# Patient Record
Sex: FEMALE | Race: BLACK OR AFRICAN AMERICAN | NOT HISPANIC OR LATINO | URBAN - METROPOLITAN AREA
[De-identification: names, ages, dates, MRNs, and addresses within clinical notes are randomized per-mention and may not be internally consistent; named-entity substitution may affect disease eponyms.]

---

## 2019-12-05 ENCOUNTER — APPOINTMENT (OUTPATIENT)
Dept: URBAN - METROPOLITAN AREA CLINIC 237 | Age: 44
Setting detail: DERMATOLOGY
End: 2019-12-05

## 2019-12-05 DIAGNOSIS — L20.89 OTHER ATOPIC DERMATITIS: ICD-10-CM

## 2019-12-05 PROCEDURE — OTHER TREATMENT REGIMEN: OTHER

## 2019-12-05 PROCEDURE — OTHER MIPS QUALITY: OTHER

## 2019-12-05 PROCEDURE — OTHER COUNSELING: OTHER

## 2019-12-05 PROCEDURE — OTHER KOH PREP: OTHER

## 2019-12-05 PROCEDURE — OTHER PATIENT SPECIFIC COUNSELING: OTHER

## 2019-12-05 PROCEDURE — OTHER PRESCRIPTION: OTHER

## 2019-12-05 PROCEDURE — OTHER DIAGNOSIS COMMENT: OTHER

## 2019-12-05 PROCEDURE — OTHER MEDICATION COUNSELING: OTHER

## 2019-12-05 PROCEDURE — 87220 TISSUE EXAM FOR FUNGI: CPT

## 2019-12-05 PROCEDURE — 99203 OFFICE O/P NEW LOW 30 MIN: CPT

## 2019-12-05 RX ORDER — CLOBETASOL PROPIONATE 0.46 MG/ML
SM AMT SOLUTION TOPICAL
Qty: 1 | Refills: 0 | Status: ERX | COMMUNITY
Start: 2019-12-05

## 2019-12-05 RX ORDER — KETOCONAZOLE 20 MG/ML
SM AMT SHAMPOO TOPICAL BID
Qty: 1 | Refills: 0 | Status: ERX | COMMUNITY
Start: 2019-12-05

## 2019-12-05 RX ORDER — TRIAMCINOLONE ACETONIDE 1 MG/G
SM AMT OINTMENT TOPICAL BID PRN
Qty: 1 | Refills: 1 | Status: ERX | COMMUNITY
Start: 2019-12-05

## 2019-12-05 RX ORDER — FLUOCINONIDE 0.5 MG/G
SM AMT OINTMENT TOPICAL BID
Qty: 1 | Refills: 0 | Status: ERX | COMMUNITY
Start: 2019-12-05

## 2019-12-05 ASSESSMENT — LOCATION DETAILED DESCRIPTION DERM
LOCATION DETAILED: INFERIOR THORACIC SPINE
LOCATION DETAILED: RIGHT DISTAL LATERAL POSTERIOR THIGH
LOCATION DETAILED: LEFT CENTRAL FRONTAL SCALP
LOCATION DETAILED: LEFT ANTERIOR PROXIMAL UPPER ARM
LOCATION DETAILED: RIGHT ANTERIOR PROXIMAL UPPER ARM
LOCATION DETAILED: LEFT INFERIOR UPPER BACK
LOCATION DETAILED: MIDDLE STERNUM
LOCATION DETAILED: LEFT PROXIMAL POSTERIOR THIGH

## 2019-12-05 ASSESSMENT — LOCATION SIMPLE DESCRIPTION DERM
LOCATION SIMPLE: UPPER BACK
LOCATION SIMPLE: LEFT SCALP
LOCATION SIMPLE: LEFT UPPER ARM
LOCATION SIMPLE: LEFT POSTERIOR THIGH
LOCATION SIMPLE: RIGHT UPPER ARM
LOCATION SIMPLE: CHEST
LOCATION SIMPLE: LEFT UPPER BACK
LOCATION SIMPLE: RIGHT POSTERIOR THIGH

## 2019-12-05 ASSESSMENT — LOCATION ZONE DERM
LOCATION ZONE: ARM
LOCATION ZONE: TRUNK
LOCATION ZONE: SCALP
LOCATION ZONE: LEG

## 2019-12-05 NOTE — PROCEDURE: PATIENT SPECIFIC COUNSELING
Detail Level: Zone
Suggested switching from Dial soap to Dove bar soap. Only use soap in sweaty skin folds areas. Stressed showering in lukewarm water and moisturizing post-shower. Recommends getting a humidifier in home to help retain moisture to skin. \\n\\nSuggested trying TMC to body rash, but pt states this hasn’t worked for her in the past when she had a bad flare. Will prescribe Lidex BABATUNDE for bad flares for up to two weeks only and TMC BABATUNDE when more controlled. Do not apply lidex to face or axillae. \\n\\nFor scalp, will prescribe Clob Sln and Keto shampoo. Increase hair washing to twice weekly if possible. \\n\\nDistribution and morphology slightly atypical for atopic dermatitis. Per patient, she has had three biopsies demonstrating eczema (other derm wanted to r/o psoriasis). If no resolution with above plan, need to consider other etiologies including psoriasis or less likely LP. No nail pitting. + joint pain per patient due to osteoarthritis. Will monitor closely.

## 2019-12-05 NOTE — PROCEDURE: MEDICATION COUNSELING
Xeltamekaz Pregnancy And Lactation Text: This medication is Pregnancy Category D and is not considered safe during pregnancy.  The risk during breast feeding is also uncertain.

## 2019-12-05 NOTE — PROCEDURE: TREATMENT REGIMEN
Initiate Treatment: triamcinolone acetonide 0.1 % topical ointment BID prn\\nDays Supply: 30\\nSig: Small amount twice daily to affected rash qd-bid PRN.  Rub in well.\\n\\nfluocinonide 0.05 % topical ointment Bid\\nDays Supply: 30\\nSig: Apply to itchy body rash areas bid X 2 weeks. Rub in well.\\n\\nketoconazole 2 % shampoo Bid\\nDays Supply: 30\\nSig: Shampoo scalp 2x/PRN. Let shampoo sit on scalp for 5 minutes before rinsing.\\n\\nclobetasol 0.05 % scalp solution Qd-bid PRN\\nSig: Apply 1-2 gtts to scalp rash qd PRN for itch.
Detail Level: Zone

## 2020-02-04 ENCOUNTER — APPOINTMENT (OUTPATIENT)
Dept: URBAN - METROPOLITAN AREA CLINIC 237 | Age: 45
Setting detail: DERMATOLOGY
End: 2020-02-04

## 2020-02-04 DIAGNOSIS — L20.89 OTHER ATOPIC DERMATITIS: ICD-10-CM

## 2020-02-04 PROBLEM — L30.9 DERMATITIS, UNSPECIFIED: Status: ACTIVE | Noted: 2020-02-04

## 2020-02-04 PROCEDURE — OTHER TREATMENT REGIMEN: OTHER

## 2020-02-04 PROCEDURE — OTHER PATIENT SPECIFIC COUNSELING: OTHER

## 2020-02-04 PROCEDURE — OTHER BIOPSY BY PUNCH METHOD: OTHER

## 2020-02-04 PROCEDURE — OTHER SEPARATE AND IDENTIFIABLE DOCUMENTATION: OTHER

## 2020-02-04 PROCEDURE — OTHER MEDICATION COUNSELING: OTHER

## 2020-02-04 PROCEDURE — 11104 PUNCH BX SKIN SINGLE LESION: CPT

## 2020-02-04 PROCEDURE — 99213 OFFICE O/P EST LOW 20 MIN: CPT | Mod: 25

## 2020-02-04 PROCEDURE — OTHER MIPS QUALITY: OTHER

## 2020-02-04 PROCEDURE — OTHER PHOTO-DOCUMENTATION: OTHER

## 2020-02-04 PROCEDURE — OTHER PRESCRIPTION: OTHER

## 2020-02-04 RX ORDER — CLOBETASOL PROPIONATE 0.5 MG/G
SM AMT OINTMENT TOPICAL BID
Qty: 1 | Refills: 0 | Status: ERX | COMMUNITY
Start: 2020-02-04

## 2020-02-04 ASSESSMENT — LOCATION SIMPLE DESCRIPTION DERM: LOCATION SIMPLE: RIGHT LOWER BACK

## 2020-02-04 ASSESSMENT — LOCATION ZONE DERM: LOCATION ZONE: TRUNK

## 2020-02-04 ASSESSMENT — LOCATION DETAILED DESCRIPTION DERM: LOCATION DETAILED: RIGHT SUPERIOR LATERAL MIDBACK

## 2020-02-04 NOTE — PROCEDURE: PATIENT SPECIFIC COUNSELING
Detail Level: Zone
MD would expect after tx’ing rash with topical steroids for 8 weeks, we would have made further progress. Pt has a long h/o eczema and use of topical steroids without success. MD feels the next best option would be to consider Dupixent, however, before considering/starting Dupixent, MD would prefer to bx rash to confirm dx. Pt signed Dupixent paperwork today. Discussed AEs of dupixent including conjunctivitis. While awaiting pathology results,  MD comfortable prescribing Clob to use on body rash until we see her back (in 2 weeks).\\n\\nScalp has improved; continue current regimen

## 2020-02-04 NOTE — PROCEDURE: BIOPSY BY PUNCH METHOD
X Depth Of Punch In Cm (Optional): 0
Punch Size In Mm: 4
Patient Will Remove Sutures At Home?: No
Wound Care: Petrolatum
Billing Type: Third-Party Bill
Anesthesia Type: 1% lidocaine with epinephrine
Dressing: bandage
Biopsy Type: H and E
Epidermal Sutures: 5-0 Nylon
Notification Instructions: Patient will be notified of biopsy results. However, patient instructed to call the office if not contacted within 2 weeks.
Detail Level: Detailed
Home Suture Removal Text: Patient was provided a home suture removal kit and will remove their sutures at home.  If they have any questions or difficulties they will call the office.
Consent: Written consent was obtained and risks were reviewed including but not limited to scarring, infection, bleeding, scabbing, incomplete removal, nerve damage and allergy to anesthesia.
Post-Care Instructions: I reviewed with the patient in detail post-care instructions. Patient is to keep the biopsy site dry overnight, and then apply bacitracin twice daily until healed. Patient may apply hydrogen peroxide soaks to remove any crusting.
Anesthesia Volume In Cc (Will Not Render If 0): 0.5
Suture Removal: 14 days
Number Of Epidermal Sutures (Optional): 2
Hemostasis: None
Was A Bandage Applied: Yes

## 2020-02-04 NOTE — PROCEDURE: TREATMENT REGIMEN
Discontinue Regimen: Lidex ointment \\nTriamcinolone ointment
Continue Regimen: Clob 0.05% scalp sln bid prn \\nKeto shampoo qd prn
Detail Level: Zone
Initiate Treatment: Clob 0.05% BABATUNDE bid to body rash

## 2020-02-18 ENCOUNTER — APPOINTMENT (OUTPATIENT)
Dept: URBAN - METROPOLITAN AREA CLINIC 237 | Age: 45
Setting detail: DERMATOLOGY
End: 2020-02-18

## 2020-02-18 DIAGNOSIS — L40.0 PSORIASIS VULGARIS: ICD-10-CM

## 2020-02-18 PROCEDURE — OTHER MIPS QUALITY: OTHER

## 2020-02-18 PROCEDURE — OTHER PATHOLOGY DISCUSSION: OTHER

## 2020-02-18 PROCEDURE — 99213 OFFICE O/P EST LOW 20 MIN: CPT

## 2020-02-18 PROCEDURE — OTHER MEDICATION COUNSELING: OTHER

## 2020-02-18 PROCEDURE — OTHER SEPARATE AND IDENTIFIABLE DOCUMENTATION: OTHER

## 2020-02-18 PROCEDURE — OTHER PATIENT SPECIFIC COUNSELING: OTHER

## 2020-02-18 PROCEDURE — OTHER DIAGNOSIS COMMENT: OTHER

## 2020-02-18 PROCEDURE — OTHER SUTURE REMOVAL (NO GLOBAL PERIOD): OTHER

## 2020-02-18 PROCEDURE — 99024 POSTOP FOLLOW-UP VISIT: CPT

## 2020-02-18 PROCEDURE — OTHER TREATMENT REGIMEN: OTHER

## 2020-02-18 PROCEDURE — OTHER ORDER TESTS: OTHER

## 2020-02-18 ASSESSMENT — LOCATION SIMPLE DESCRIPTION DERM
LOCATION SIMPLE: ANTERIOR SCALP
LOCATION SIMPLE: LEFT POSTERIOR UPPER ARM
LOCATION SIMPLE: RIGHT UPPER BACK
LOCATION SIMPLE: RIGHT POSTERIOR UPPER ARM
LOCATION SIMPLE: RIGHT LOWER BACK

## 2020-02-18 ASSESSMENT — LOCATION DETAILED DESCRIPTION DERM
LOCATION DETAILED: RIGHT PROXIMAL POSTERIOR UPPER ARM
LOCATION DETAILED: LEFT PROXIMAL POSTERIOR UPPER ARM
LOCATION DETAILED: RIGHT SUPERIOR LATERAL MIDBACK
LOCATION DETAILED: RIGHT INFERIOR MEDIAL UPPER BACK
LOCATION DETAILED: MID-FRONTAL SCALP

## 2020-02-18 ASSESSMENT — BSA PSORIASIS: % BODY COVERED IN PSORIASIS: 5

## 2020-02-18 ASSESSMENT — LOCATION ZONE DERM
LOCATION ZONE: ARM
LOCATION ZONE: TRUNK
LOCATION ZONE: SCALP

## 2020-02-18 NOTE — PROCEDURE: TREATMENT REGIMEN
<--- Click to Launch ICDx for PreOp, PostOp and Procedure
Detail Level: Generalized
Continue Regimen: Clob 0.05% scalp sln bid prn \\nKeto shampoo qd prn\\nClob 0.05% BABATUNDE bid to body rash - encouraged patient to decrease use to prn
Initiate Treatment: Humira - approval pending

## 2020-02-18 NOTE — PROCEDURE: PATHOLOGY DISCUSSION
Quality 265: Biopsy Follow-Up: Biopsy results reviewed, communicated, tracked, and documented
Detail Level: Zone
Add 75368 Cpt? (Important Note: In 2017 The Use Of 78285 Is Being Tracked By Cms To Determine Future Global Period Reimbursement For Global Periods): yes

## 2020-02-20 ENCOUNTER — APPOINTMENT (OUTPATIENT)
Dept: URBAN - METROPOLITAN AREA CLINIC 237 | Age: 45
Setting detail: DERMATOLOGY
End: 2020-02-20

## 2020-02-20 DIAGNOSIS — L40.0 PSORIASIS VULGARIS: ICD-10-CM

## 2020-02-20 PROCEDURE — OTHER PRESCRIPTION: OTHER

## 2020-02-20 RX ORDER — ADALIMUMAB 40MG/0.8ML
AS DIRECTED KIT SUBCUTANEOUS AS DIRECTED
Qty: 2 | Refills: 0 | Status: ERX | COMMUNITY
Start: 2020-02-20

## 2020-03-03 ENCOUNTER — RX ONLY (RX ONLY)
Age: 45
End: 2020-03-03

## 2020-03-03 RX ORDER — ADALIMUMAB 40MG/0.8ML
AS DIRECTED KIT SUBCUTANEOUS AS DIRECTED
Qty: 2 | Refills: 0 | Status: ERX

## 2020-03-05 ENCOUNTER — RX ONLY (RX ONLY)
Age: 45
End: 2020-03-05

## 2020-03-05 RX ORDER — ADALIMUMAB 40MG/0.8ML
KIT SUBCUTANEOUS AS DIRECTED
Qty: 2 | Refills: 0 | Status: ERX

## 2020-06-09 ENCOUNTER — APPOINTMENT (OUTPATIENT)
Age: 45
Setting detail: DERMATOLOGY
End: 2020-06-10

## 2020-06-09 ENCOUNTER — RX ONLY (RX ONLY)
Age: 45
End: 2020-06-09

## 2020-06-09 DIAGNOSIS — L40.0 PSORIASIS VULGARIS: ICD-10-CM

## 2020-06-09 PROCEDURE — OTHER TREATMENT REGIMEN: OTHER

## 2020-06-09 PROCEDURE — OTHER DIAGNOSIS COMMENT: OTHER

## 2020-06-09 PROCEDURE — OTHER MIPS QUALITY: OTHER

## 2020-06-09 PROCEDURE — OTHER PRESCRIPTION: OTHER

## 2020-06-09 PROCEDURE — OTHER PATIENT SPECIFIC COUNSELING: OTHER

## 2020-06-09 PROCEDURE — 99213 OFFICE O/P EST LOW 20 MIN: CPT | Mod: 95

## 2020-06-09 RX ORDER — MOMETASONE FUROATE 1 MG/G
SM AMT OINTMENT TOPICAL BID
Qty: 1 | Refills: 0 | Status: ERX | COMMUNITY
Start: 2020-06-09

## 2020-06-09 RX ORDER — CLOBETASOL PROPIONATE 0.5 MG/G
SM AMT OINTMENT TOPICAL BID
Qty: 1 | Refills: 0 | Status: ERX

## 2020-06-09 RX ORDER — CLOBETASOL PROPIONATE 0.46 MG/ML
SM AMT SOLUTION TOPICAL
Qty: 1 | Refills: 0 | Status: ERX

## 2020-06-09 ASSESSMENT — LOCATION ZONE DERM
LOCATION ZONE: TRUNK
LOCATION ZONE: SCALP

## 2020-06-09 ASSESSMENT — LOCATION SIMPLE DESCRIPTION DERM
LOCATION SIMPLE: ANTERIOR SCALP
LOCATION SIMPLE: UPPER BACK

## 2020-06-09 ASSESSMENT — LOCATION DETAILED DESCRIPTION DERM
LOCATION DETAILED: SUPERIOR THORACIC SPINE
LOCATION DETAILED: MID-FRONTAL SCALP

## 2020-06-09 NOTE — PROCEDURE: TREATMENT REGIMEN
Detail Level: Generalized
Initiate Treatment: Mometasone 0.1% BABATUNDE bid to chest
Continue Regimen: Clob 0.05% scalp sln bid prn \\nKeto shampoo qd prn\\nClob 0.05% BABATUNDE bid to legs

## 2022-12-21 NOTE — PROCEDURE: MEDICATION COUNSELING
Paramedian Forehead Flap Text: A decision was made to reconstruct the defect utilizing an interpolation axial flap and a staged reconstruction.  A telfa template was made of the defect.  This telfa template was then used to outline the paramedian forehead pedicle flap.  The donor area for the pedicle flap was then injected with anesthesia.  The flap was excised through the skin and subcutaneous tissue down to the layer of the underlying musculature.  The pedicle flap was carefully excised within this deep plane to maintain its blood supply.  The edges of the donor site were undermined.   The donor site was closed in a primary fashion.  The pedicle was then rotated into position and sutured.  Once the tube was sutured into place, adequate blood supply was confirmed with blanching and refill.  The pedicle was then wrapped with xeroform gauze and dressed appropriately with a telfa and gauze bandage to ensure continued blood supply and protect the attached pedicle. Niacinamide Counseling: I recommended taking niacin or niacinamide, also know as vitamin B3, twice daily. Recent evidence suggests that taking vitamin B3 (500 mg twice daily) can reduce the risk of actinic keratoses and non-melanoma skin cancers. Side effects of vitamin B3 include flushing and headache.